# Patient Record
Sex: MALE | Race: WHITE
[De-identification: names, ages, dates, MRNs, and addresses within clinical notes are randomized per-mention and may not be internally consistent; named-entity substitution may affect disease eponyms.]

---

## 2018-02-28 ENCOUNTER — HOSPITAL ENCOUNTER (EMERGENCY)
Dept: HOSPITAL 77 - KA.ED | Age: 68
Discharge: HOME | End: 2018-02-28
Payer: MEDICARE

## 2018-02-28 DIAGNOSIS — Z79.899: ICD-10-CM

## 2018-02-28 DIAGNOSIS — S29.011A: Primary | ICD-10-CM
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
## 2018-02-28 LAB
CHLORIDE SERPL-SCNC: 101 MMOL/L (ref 98–115)
SODIUM SERPL-SCNC: 137 MMOL/L (ref 136–145)

## 2018-02-28 NOTE — EDM.PDOC
ED HPI GENERAL MEDICAL PROBLEM





- General


Chief Complaint: Chest Pain


Time Seen by Provider: 02/28/18 16:30


Source of Information: Reports: Patient


History Limitations: Reports: No Limitations





- History of Present Illness


INITIAL COMMENTS - FREE TEXT/NARRATIVE: 





Patient presents with recent pain in left upper chest.  He tells me that around 

0930 or 1000 this morning (7 hours ago) he had a sharp, stabbing pain in left 

upper chest.  This lasted 2-3 minutes and started about 30 minutes after he had 

finished shovelling snow.  He had been shovelling snow for about 15 minutes and 

denies any chest pressure, tightness or heaviness while he was shovelling or 

afterward.  He had the exact same thing yesterday about 30 minutes after half 

hour of shovelling.  He says both days he only had the one episode of pain and 

it hasn't returned in last 7 hours today.  Pt tells me that he hasn't seen his 

doctor for any regular checkups for several years.  He used to take Lisinopril 

for blood pressure but doesn't take any Rx medications currently.             





- Related Data


 Allergies











Allergy/AdvReac Type Severity Reaction Status Date / Time


 


No Known Allergies Allergy   Verified 02/28/18 16:33











Home Meds: 


 Home Meds





Garlic 1 tab PO DAILY 02/28/18 [History]


Ubidecarenone [Co Q-10] 50 mg PO DAILY 02/28/18 [History]


Vit A/C/E AC/Znox/Cupric Oxide [Eye Vitamin-Minerals Tablet] 1 tab PO DAILY 02/ 28/18 [History]











ED ROS GENERAL





- Review of Systems


Review Of Systems: See Below


Constitutional: Denies: Fever, Chills, Malaise, Weakness, Diaphoresis, 

Decreased Appetite


HEENT: Denies: Vision Change


Respiratory: Denies: Shortness of Breath, Cough


Cardiovascular: Denies: Edema, Syncope


GI/Abdominal: Denies: Abdominal Pain, Diarrhea, Vomiting


: Reports: No Symptoms


Musculoskeletal: Reports: Shoulder Pain.  Denies: Neck Pain, Arm Pain, Back Pain


Skin: Denies: Cyanosis, Jaundice, Mottled, Pallor, Diaphoresis


Neurological: Denies: Confusion, Dizziness, Headache, Seizure, Syncope, Trouble 

Speaking, Difficulty Walking


Psychiatric: Denies: Agitation, Anxiety, Confusion





ED EXAM, GENERAL





- Physical Exam


Exam: See Below


Exam Limited By: No Limitations


General Appearance: Alert, WD/WN, No Apparent Distress, Other (Well-appearing 

male in no distress)


Eye Exam: Bilateral Eye: EOMI, Normal Inspection, PERRL


Ears: Normal External Exam, Hearing Grossly Normal


Nose: Normal Inspection, No Blood


Throat/Mouth: Normal Inspection, Normal Lips, Normal Voice, No Airway Compromise


Head: Atraumatic, Normocephalic


Neck: Normal Inspection, Supple, Non-Tender, Full Range of Motion.  No: Carotid 

Bruit


Respiratory/Chest: No Respiratory Distress, Lungs Clear, Normal Breath Sounds, 

No Accessory Muscle Use, Other (Palpation of chest reveals tenderness of upper, 

lateral pectoral muscle that is the exact same pain patient had earlier today 

and yesterday that he was concerned about.).  No: Stridor


Cardiovascular: Normal Peripheral Pulses, Regular Rate, Rhythm, No Murmur


Peripheral Pulses: 2+: Carotid (L), Carotid (R), Radial (L), Radial (R), 

Posterior Tibial (L), Posterior Tibial (R)


GI/Abdominal: Normal Bowel Sounds, Soft, Non-Tender, No Organomegaly, No 

Distention, No Abnormal Bruit, No Mass


Extremities: Normal Inspection, Normal Range of Motion, Non-Tender, No Pedal 

Edema


Neurological: Alert, Oriented, Normal Cognition, No Motor/Sensory Deficits


Psychiatric: Normal Affect, Normal Mood


Skin Exam: Warm, Dry, Intact, Normal Color, No Rash





Course





- Vital Signs


Last Recorded V/S: 


 Last Vital Signs











Temp  98.9 F   02/28/18 16:39


 


Pulse  91   02/28/18 17:12


 


Resp  18   02/28/18 17:12


 


BP  151/93 H  02/28/18 17:12


 


Pulse Ox  95   02/28/18 17:12














- Orders/Labs/Meds


Orders: 


 Active Orders 24 hr











 Category Date Time Status


 


 EKG Documentation Completion [RC] ASDIRECTED Care  02/28/18 16:32 Active


 


 Chest 2V [CR] Routine Exams  02/28/18 Ordered


 


 EKG 12 Lead [EK] Routine Ther  02/28/18 16:31 Ordered











Labs: 


 Laboratory Tests











  02/28/18 02/28/18 Range/Units





  16:20 16:20 


 


WBC   7.1  (5.0-10.0)  10^3/uL


 


RBC   4.65  (4.50-6.00)  10^6/uL


 


Hgb   15.4  (13.0-17.0)  g/dL


 


Hct   45.7  (40.0-52.0)  %


 


MCV   98.3 H  (82.0-92.0)  fL


 


MCH   33.1 H  (27.0-31.0)  pg


 


MCHC   33.7  (32.0-36.0)  g/dL


 


RDW   13.4  (11.5-14.5)  %


 


Plt Count   217  (150-300)  10^3/uL


 


MPV   8.3  (7.4-10.4)  fL


 


Neut % (Auto)   67.7  (50.0-70.0)  %


 


Lymph % (Auto)   21.9  (20.0-40.0)  %


 


Mono % (Auto)   9.0 H  (2.0-8.0)  %


 


Eos % (Auto)   1.0  (1.0-3.0)  %


 


Baso % (Auto)   0.4  (0.0-1.0)  %


 


Neut # (Auto)   4.8  (2.5-7.0)  10^3/uL


 


Lymph # (Auto)   1.6  (1.0-4.0)  10^3/uL


 


Mono # (Auto)   0.6  (0.1-0.8)  10^3/uL


 


Eos # (Auto)   0.1  (0.1-0.3)  10^3/uL


 


Baso # (Auto)   0.0  (0.0-0.1)  10^3/uL


 


Sodium  137   (136-145)  mmol/L


 


Potassium  3.9   (3.3-5.3)  mmol/L


 


Chloride  101   ()  mmol/L


 


Carbon Dioxide  22.4   (21.0-32.0)  mmol/L


 


BUN  19   (6-25)  mg/dL


 


Creatinine  1.03   (0.51-1.17)  mg/dL


 


Est Cr Clr Drug Dosing  65.96   mL/min


 


Estimated GFR (MDRD)  > 60   mL/min


 


Glucose  126 H   ()  mg/dL


 


Calcium  9.0   (8.7-10.3)  mg/dL


 


Troponin I  < 0.04   (0.00-0.070)  ng/mL











Meds: 


Medications














Discontinued Medications














Generic Name Dose Route Start Last Admin





  Trade Name Freq  PRN Reason Stop Dose Admin


 


Aspirin  324 mg  02/28/18 16:31  02/28/18 16:35





  Aspirin  PO  02/28/18 16:32  324 mg





  ONETIME ONE   Administration














- Re-Assessments/Exams


Free Text/Narrative Re-Assessment/Exam: 





02/28/18 17:25


CXR normal.  Trop is normal.  Discussed findings and treatment plan with 

patient and he is discharged to home in stable condition.





Departure





- Departure


Time of Disposition: 17:21


Disposition: Home, Self-Care 01


Condition: Good


Clinical Impression: 


Chest wall muscle strain


Qualifiers:


 Encounter type: initial encounter Qualified Code(s): S29.011A - Strain of 

muscle and tendon of front wall of thorax, initial encounter





Instructions:  Muscle Strain, Easy-to-Read


Referrals: 


PCP,None [Primary Care Provider] - 


Forms:  ED Department Discharge


Additional Instructions: 


1. As we discussed, check your blood pressure at home several times and record 

the results to take with you to your doctor visit next week.


2. Call tomorrow to make an appointment with your PCP next week for evaluation 

of your blood pressure and discuss if you need to have a stress test.


3. Recheck with your PCP sooner if any problems or go to ER.





- My Orders


Last 24 Hours: 


My Active Orders





02/28/18


Chest 2V [CR] Routine 





02/28/18 16:31


EKG 12 Lead [EK] Routine 





02/28/18 16:32


EKG Documentation Completion [RC] ASDIRECTED 














- Assessment/Plan


Last 24 Hours: 


My Active Orders





02/28/18


Chest 2V [CR] Routine 





02/28/18 16:31


EKG 12 Lead [EK] Routine 





02/28/18 16:32


EKG Documentation Completion [RC] ASDIRECTED